# Patient Record
Sex: MALE | Race: WHITE | NOT HISPANIC OR LATINO | Employment: PART TIME | URBAN - METROPOLITAN AREA
[De-identification: names, ages, dates, MRNs, and addresses within clinical notes are randomized per-mention and may not be internally consistent; named-entity substitution may affect disease eponyms.]

---

## 2018-05-26 ENCOUNTER — HOSPITAL ENCOUNTER (EMERGENCY)
Facility: HOSPITAL | Age: 23
Discharge: HOME | End: 2018-05-26
Attending: EMERGENCY MEDICINE
Payer: MEDICAID

## 2018-05-26 VITALS
RESPIRATION RATE: 18 BRPM | HEIGHT: 68 IN | BODY MASS INDEX: 34.86 KG/M2 | OXYGEN SATURATION: 99 % | SYSTOLIC BLOOD PRESSURE: 128 MMHG | TEMPERATURE: 99 F | HEART RATE: 94 BPM | WEIGHT: 230 LBS | DIASTOLIC BLOOD PRESSURE: 72 MMHG

## 2018-05-26 DIAGNOSIS — R31.9 HEMATURIA, UNSPECIFIED TYPE: ICD-10-CM

## 2018-05-26 DIAGNOSIS — J02.9 PHARYNGITIS, UNSPECIFIED ETIOLOGY: ICD-10-CM

## 2018-05-26 DIAGNOSIS — R09.81 NASAL CONGESTION: ICD-10-CM

## 2018-05-26 DIAGNOSIS — R74.8 ELEVATED CPK: ICD-10-CM

## 2018-05-26 DIAGNOSIS — L27.0 DRUG ERUPTION: Primary | ICD-10-CM

## 2018-05-26 LAB
ALBUMIN SERPL BCP-MCNC: 4.1 G/DL
ALP SERPL-CCNC: 102 U/L
ALT SERPL W/O P-5'-P-CCNC: 51 U/L
ANION GAP SERPL CALC-SCNC: 14 MMOL/L
APTT BLDCRRT: 26.9 SEC
AST SERPL-CCNC: 45 U/L
BACTERIA #/AREA URNS HPF: ABNORMAL /HPF
BASOPHILS # BLD AUTO: 0.01 K/UL
BASOPHILS NFR BLD: 0.1 %
BILIRUB SERPL-MCNC: 1.1 MG/DL
BILIRUB UR QL STRIP: NEGATIVE
BUN SERPL-MCNC: 11 MG/DL
CALCIUM SERPL-MCNC: 9.8 MG/DL
CHLORIDE SERPL-SCNC: 103 MMOL/L
CK SERPL-CCNC: 410 U/L
CLARITY UR: CLEAR
CO2 SERPL-SCNC: 23 MMOL/L
COLOR UR: ABNORMAL
CREAT SERPL-MCNC: 1.3 MG/DL
DEPRECATED S PYO AG THROAT QL EIA: NEGATIVE
DIFFERENTIAL METHOD: ABNORMAL
EOSINOPHIL # BLD AUTO: 0 K/UL
EOSINOPHIL NFR BLD: 0.5 %
ERYTHROCYTE [DISTWIDTH] IN BLOOD BY AUTOMATED COUNT: 13.3 %
EST. GFR  (AFRICAN AMERICAN): >60 ML/MIN/1.73 M^2
EST. GFR  (NON AFRICAN AMERICAN): >60 ML/MIN/1.73 M^2
GLUCOSE SERPL-MCNC: 132 MG/DL
GLUCOSE UR QL STRIP: NEGATIVE
HCT VFR BLD AUTO: 41 %
HGB BLD-MCNC: 14 G/DL
HGB UR QL STRIP: ABNORMAL
HYALINE CASTS #/AREA URNS LPF: 0 /LPF
INR PPP: 1.1
KETONES UR QL STRIP: ABNORMAL
LEUKOCYTE ESTERASE UR QL STRIP: NEGATIVE
LYMPHOCYTES # BLD AUTO: 1.1 K/UL
LYMPHOCYTES NFR BLD: 14 %
MCH RBC QN AUTO: 27.4 PG
MCHC RBC AUTO-ENTMCNC: 34.1 G/DL
MCV RBC AUTO: 80 FL
MICROSCOPIC COMMENT: ABNORMAL
MONOCYTES # BLD AUTO: 0.7 K/UL
MONOCYTES NFR BLD: 8.1 %
NEUTROPHILS # BLD AUTO: 6.3 K/UL
NEUTROPHILS NFR BLD: 77.3 %
NITRITE UR QL STRIP: NEGATIVE
NON-SQ EPI CELLS #/AREA URNS HPF: 2 /HPF
PH UR STRIP: 5 [PH] (ref 5–8)
PLATELET # BLD AUTO: 110 K/UL
PMV BLD AUTO: 12.5 FL
POTASSIUM SERPL-SCNC: 3.5 MMOL/L
PROT SERPL-MCNC: 8.8 G/DL
PROT UR QL STRIP: ABNORMAL
PROTHROMBIN TIME: 11.8 SEC
RBC # BLD AUTO: 5.11 M/UL
RBC #/AREA URNS HPF: 3 /HPF (ref 0–4)
SODIUM SERPL-SCNC: 140 MMOL/L
SP GR UR STRIP: >1.03 (ref 1–1.03)
URN SPEC COLLECT METH UR: ABNORMAL
UROBILINOGEN UR STRIP-ACNC: ABNORMAL EU/DL
WBC # BLD AUTO: 8.12 K/UL
WBC #/AREA URNS HPF: 1 /HPF (ref 0–5)
YEAST URNS QL MICRO: ABNORMAL

## 2018-05-26 PROCEDURE — 87880 STREP A ASSAY W/OPTIC: CPT

## 2018-05-26 PROCEDURE — 85025 COMPLETE CBC W/AUTO DIFF WBC: CPT

## 2018-05-26 PROCEDURE — 85610 PROTHROMBIN TIME: CPT

## 2018-05-26 PROCEDURE — 80053 COMPREHEN METABOLIC PANEL: CPT

## 2018-05-26 PROCEDURE — 81000 URINALYSIS NONAUTO W/SCOPE: CPT

## 2018-05-26 PROCEDURE — 99283 EMERGENCY DEPT VISIT LOW MDM: CPT

## 2018-05-26 PROCEDURE — 82550 ASSAY OF CK (CPK): CPT

## 2018-05-26 PROCEDURE — 87081 CULTURE SCREEN ONLY: CPT

## 2018-05-26 PROCEDURE — 85730 THROMBOPLASTIN TIME PARTIAL: CPT

## 2018-05-26 RX ORDER — FLUTICASONE PROPIONATE 50 MCG
1 SPRAY, SUSPENSION (ML) NASAL 2 TIMES DAILY PRN
Qty: 15 G | Refills: 0 | Status: SHIPPED | OUTPATIENT
Start: 2018-05-26

## 2018-05-26 RX ORDER — AMOXICILLIN AND CLAVULANATE POTASSIUM 875; 125 MG/1; MG/1
1 TABLET, FILM COATED ORAL
COMMUNITY
End: 2018-05-26 | Stop reason: SINTOL

## 2018-05-26 RX ORDER — IBUPROFEN 400 MG/1
400 TABLET ORAL EVERY 4 HOURS
COMMUNITY
End: 2019-05-01 | Stop reason: SDUPTHER

## 2018-05-27 NOTE — ED PROVIDER NOTES
Encounter Date: 5/26/2018    SORT:  23 y.o. male presenting to ED for rash to b/l lower extremities. Started Augmentin for the first time 2 days ago for sore throat. Denies Hx of similar symptoms. If orders were placed, they are pending.     Amrit Gloria PA-C  05/26/2018  8:52 PM     NEW NOTE STARTED BY ACCEPTING PROVIDER. PLEASE REFER TO ACCEPTING PROVIDER'S NOTE.      History   No chief complaint on file.    This is a 23-year-old male presents with complaint of rash developing on both legs yesterday.  Patient reports a burning sensation where the rash is on bilateral legs.  He started taking Augmentin 2 days ago for a sore throat and nasal congestion.  Explains a sore throat and slightly improved.  He denies fever, arthralgias, diarrhea, abdominal pain, shortness of breath.          Review of patient's allergies indicates:  Allergies not on file  No past medical history on file.  No past surgical history on file.  No family history on file.  Social History   Substance Use Topics    Smoking status: Not on file    Smokeless tobacco: Not on file    Alcohol use Not on file     Review of Systems    Physical Exam     Initial Vitals   BP Pulse Resp Temp SpO2   -- -- -- -- --      MAP       --         Physical Exam    ED Course   Procedures  Labs Reviewed - No data to display                            ED Course as of May 26 2303   Sat May 26, 2018   2215 Occult Blood UA: (!) 2+ [RB]   2303 CPK: (!) 410 [AW]   2303 CPK: (!) 410 [AW]      ED Course User Index  [AW] Bar Roman PA-C  [RB] Erin Beltran MD     Clinical Impression:   There were no encounter diagnoses.

## 2018-05-27 NOTE — ED TRIAGE NOTES
Pt reports rash to bilateral lower extremities.  Reports rash started about 0500 this morning.  States he started taking Augmentin and ibuprofen two days ago.  Pt reports Augmentin was last taken this morning at 0400.

## 2018-05-27 NOTE — ED PROVIDER NOTES
Encounter Date: 5/26/2018       History     Chief Complaint   Patient presents with    Rash     pt c/o rash to bilateral LE startng at 4:00 am after taking augmentin and ibuprofen 2 days ago     This is a 23-year-old male with no medical history presents with rash to bilateral lower extremities that started yesterday.  Patient reports the rash is slightly burning.  It does not itch, and is not particularly painful.  Patient was prescribed Augmentin and ibuprofen which he started taking 2 days ago for sore throat and sinus congestion. He has never taken Augmentin before.  He denies other rash, arthralgias, fever, abdominal pain, diarrhea, or joint swelling.  He reports he does still have a mild sore throat and nasal congestion.  No family history of blood or known rheumatologic disorders.  No recent significant outdoor exposure.           Review of patient's allergies indicates:  No Known Allergies  History reviewed. No pertinent past medical history.  History reviewed. No pertinent surgical history.  History reviewed. No pertinent family history.  Social History   Substance Use Topics    Smoking status: Current Some Day Smoker    Smokeless tobacco: Not on file      Comment: Pt reports he smokes hookah sometimes.     Alcohol use No     Review of Systems   Constitutional: Negative for fatigue and fever.   HENT: Positive for congestion and sore throat.    Respiratory: Negative for shortness of breath.    Cardiovascular: Negative for chest pain.   Gastrointestinal: Negative for abdominal pain, diarrhea, nausea and vomiting.   Genitourinary: Negative for dysuria.   Musculoskeletal: Negative for arthralgias, back pain, joint swelling and myalgias.   Skin: Positive for rash.   Neurological: Negative for weakness.   Hematological: Does not bruise/bleed easily.       Physical Exam     Initial Vitals [05/26/18 2053]   BP Pulse Resp Temp SpO2   139/82 89 20 99.7 °F (37.6 °C) 100 %      MAP       101         Physical  Exam    Vitals reviewed.  Constitutional: He appears well-developed and well-nourished. He is not diaphoretic. No distress.   HENT:   Head: Normocephalic and atraumatic.   Right Ear: External ear normal.   Left Ear: External ear normal.   Nose: Mucosal edema present. No sinus tenderness. Right sinus exhibits no maxillary sinus tenderness and no frontal sinus tenderness. Left sinus exhibits no maxillary sinus tenderness and no frontal sinus tenderness.   Symmetrically enlarged tonsils without exudate. No uvula edema or deviation.   Eyes: Conjunctivae are normal. No scleral icterus.   Neck: Normal range of motion. Neck supple.   Cardiovascular: Normal rate, regular rhythm, normal heart sounds and intact distal pulses.   Pulmonary/Chest: Breath sounds normal. No respiratory distress. He has no wheezes. He has no rhonchi. He has no rales. He exhibits no tenderness.   Abdominal: Soft. He exhibits no distension and no mass. There is no tenderness. There is no rebound and no guarding.   Musculoskeletal: Normal range of motion.   Lymphadenopathy:     He has no cervical adenopathy.   Neurological: He is alert and oriented to person, place, and time.   Skin: Skin is warm and dry. Rash noted. No erythema.   Raised, macular papular, erythematous, nonblanching rash to bilateral lower extremities below the knees, greatest in concentration near the ankles, but also found on the dorsum of both feet.  No lesions to the palms or soles, or other parts of his body.         ED Course   Procedures  Labs Reviewed   CBC W/ AUTO DIFFERENTIAL - Abnormal; Notable for the following:        Result Value    MCV 80 (*)     Platelets 110 (*)     Gran% 77.3 (*)     Lymph% 14.0 (*)     All other components within normal limits   COMPREHENSIVE METABOLIC PANEL - Abnormal; Notable for the following:     Glucose 132 (*)     Total Protein 8.8 (*)     Total Bilirubin 1.1 (*)     AST 45 (*)     ALT 51 (*)     All other components within normal limits    URINALYSIS - Abnormal; Notable for the following:     Specific Gravity, UA >1.030 (*)     Protein, UA 1+ (*)     Ketones, UA 1+ (*)     Occult Blood UA 2+ (*)     Urobilinogen, UA 4.0-6.0 (*)     All other components within normal limits   URINALYSIS MICROSCOPIC - Abnormal; Notable for the following:     Yeast, UA Few (*)     Non-Squam Epith 2 (*)     All other components within normal limits   CK - Abnormal; Notable for the following:      (*)     All other components within normal limits   THROAT SCREEN, RAPID   CULTURE, STREP A,  THROAT   PROTIME-INR   APTT   CK             Medical Decision Making:   Initial Assessment:   23-year-old male with no medical history has a rash to bilateral legs starting the day after starting Augmentin for sore throat and sinus congestion.  He has a purpuric rash to bilateral legs.  No lesions to the palms, soles or other body part.  No respiratory distress or urticaria.  Lungs are clear with normal work of breathing.  I doubt anaphylaxis.  No mucocutaneous lesions. Doubt SJS/TEN.   ED Management:  Labs obtained show normal coags and platelets 110.   UA with 2+ occult blood. .  Doubt significant rhabdo or myoglobinuria.  Patient likely with drug eruption, side effects of Augmentin.  Augmentin also with hematuria in side effect profile. Will discontinue Augmentin.  Rapid strep negative. Will continue symptomatic management for pharyngitis and add Flonase for nasal congestion.  Low suspicion for vasculitis, septicemia, or other serious pathology.  Patient instructed to follow up with PCP for re-evaluation in 2 days.  He was also discharged with return precautions.  He verbalized understanding and agreed with plan.                   ED Course as of May 27 0022   Sat May 26, 2018   2215 Occult Blood UA: (!) 2+ [RB]   2303 CPK: (!) 410 [AW]   2303 CPK: (!) 410 [AW]      ED Course User Index  [AW] Bar Roman PA-C  [RB] Erin Beltran MD     Clinical Impression:   The  primary encounter diagnosis was Drug eruption. Diagnoses of Pharyngitis, unspecified etiology, Nasal congestion, Elevated CPK, and Hematuria, unspecified type were also pertinent to this visit.                           Bar Roman PA-C  05/27/18 0022

## 2018-05-28 LAB — BACTERIA THROAT CULT: NORMAL

## 2019-05-01 ENCOUNTER — HOSPITAL ENCOUNTER (EMERGENCY)
Facility: HOSPITAL | Age: 24
Discharge: HOME | End: 2019-05-01
Attending: EMERGENCY MEDICINE
Payer: MEDICAID

## 2019-05-01 VITALS
OXYGEN SATURATION: 98 % | SYSTOLIC BLOOD PRESSURE: 145 MMHG | WEIGHT: 230 LBS | HEART RATE: 77 BPM | HEIGHT: 68 IN | BODY MASS INDEX: 34.86 KG/M2 | RESPIRATION RATE: 22 BRPM | TEMPERATURE: 98 F | DIASTOLIC BLOOD PRESSURE: 83 MMHG

## 2019-05-01 DIAGNOSIS — N43.3 HYDROCELE IN ADULT: Primary | ICD-10-CM

## 2019-05-01 DIAGNOSIS — N50.819 TESTICLE PAIN: ICD-10-CM

## 2019-05-01 LAB
BILIRUB UR QL STRIP: NEGATIVE
CLARITY UR: CLEAR
COLOR UR: YELLOW
GLUCOSE UR QL STRIP: NEGATIVE
HGB UR QL STRIP: NEGATIVE
KETONES UR QL STRIP: NEGATIVE
LEUKOCYTE ESTERASE UR QL STRIP: NEGATIVE
NITRITE UR QL STRIP: NEGATIVE
PH UR STRIP: 5 [PH] (ref 5–8)
PROT UR QL STRIP: NEGATIVE
SP GR UR STRIP: 1.03 (ref 1–1.03)
URN SPEC COLLECT METH UR: NORMAL
UROBILINOGEN UR STRIP-ACNC: NEGATIVE EU/DL

## 2019-05-01 PROCEDURE — 87491 CHLMYD TRACH DNA AMP PROBE: CPT

## 2019-05-01 PROCEDURE — 99284 EMERGENCY DEPT VISIT MOD MDM: CPT

## 2019-05-01 PROCEDURE — 81003 URINALYSIS AUTO W/O SCOPE: CPT

## 2019-05-01 RX ORDER — IBUPROFEN 600 MG/1
600 TABLET ORAL EVERY 6 HOURS PRN
Qty: 30 TABLET | Refills: 0 | Status: SHIPPED | OUTPATIENT
Start: 2019-05-01

## 2019-05-02 NOTE — ED TRIAGE NOTES
Pt arrived to the ED due to testicle pain that started around 3 pm today. Pt reports that this is the pt's 2 occurrence of testicular pain. Pt reports his 1st occurrence was a couple weeks ago and lasted 7 hours.

## 2019-05-02 NOTE — DISCHARGE INSTRUCTIONS
On exam you do not have any hernias.  Ultrasound demonstrates normal blood flow to the testicles but does show a hydrocele.  Were tighter fitting and more supportive underwear.  Use ibuprofen as needed for pain. Make an appointment to see a urologist to monitor your symptoms and address if any further treatment is needed for your hydrocele.  Return to the emergency department for severe testicle pain that is not improved with medications, fever, pain or burning with urination or any new, worsening or concerning symptoms.

## 2019-05-02 NOTE — ED PROVIDER NOTES
Encounter Date: 5/1/2019    SCRIBE #1 NOTE: I, Benny Anthony, am scribing for, and in the presence of,  Katelynn Estrada MD. I have scribed the following portions of the note - Other sections scribed: HPI, ROS, PE.       History     Chief Complaint   Patient presents with    Testicle Pain     reports having pain to testicle two weeks ago, and now reports return of pain to right testicle today.     CC: Testicle Pain    HPI: This is a 24 y.o. M who has no PMHx who presents to the ED for emergent evaluation of acute right testicular pain with associated lower abdominal discomfort that began today. Symptoms are exacerbated by ambulating and alleviated by lying down and resting. Pt reports a previous episode of left testicular pain 2 weeks ago that spontaneously resolved. Pt states that he worked out at the gym for the first time 2 weeks ago. Pt has no STD concerns or high risk sexual activity. Pt denies fever, chills, SOB, CP, nausea, vomiting, diarrhea, penile discharge, dysuria, or urinary frequency.    The history is provided by the patient. No  was used.     Review of patient's allergies indicates:   Allergen Reactions    Amoxicillin      No past medical history on file.  No past surgical history on file.  No family history on file.  Social History     Tobacco Use    Smoking status: Current Some Day Smoker    Tobacco comment: Pt reports he smokes hookah sometimes.    Substance Use Topics    Alcohol use: No    Drug use: No     Review of Systems   Constitutional: Negative for chills and fever.   HENT: Negative for ear pain and sore throat.    Eyes: Negative for pain.   Respiratory: Negative for cough and shortness of breath.    Cardiovascular: Negative for chest pain.   Gastrointestinal: Negative for abdominal pain, diarrhea, nausea and vomiting.        (+) Lower abdominal discomfort   Genitourinary: Positive for testicular pain. Negative for discharge, dysuria and frequency.    Musculoskeletal: Negative for back pain.   Skin: Negative for rash.   Neurological: Negative for headaches.       Physical Exam     Initial Vitals [05/01/19 1851]   BP Pulse Resp Temp SpO2   (!) 146/74 77 18 98.6 °F (37 °C) 98 %      MAP       --         Physical Exam    Nursing note and vitals reviewed.  Constitutional: He is not diaphoretic. No distress.   HENT:   Head: Normocephalic and atraumatic.   Mouth/Throat: Oropharynx is clear and moist.   Eyes: Conjunctivae and EOM are normal. Pupils are equal, round, and reactive to light. No scleral icterus.   Neck: Normal range of motion. Neck supple. No JVD present.   Cardiovascular: Normal rate, regular rhythm and intact distal pulses.   Pulmonary/Chest: Breath sounds normal. No stridor. No respiratory distress.   Abdominal: Soft. Bowel sounds are normal. He exhibits no distension. There is no tenderness. No hernia. Hernia confirmed negative in the right inguinal area and confirmed negative in the left inguinal area.   Genitourinary: Penis normal. Cremasteric reflex is present. Right testis shows no mass. Left testis shows no mass. Circumcised. No penile erythema. No discharge found.   Genitourinary Comments: Bilateral tenderness of the spermatic cord.  Mild enlargement of the right testicle compared to left.   Musculoskeletal: Normal range of motion. He exhibits no edema or tenderness.   Lymphadenopathy: No inguinal adenopathy noted on the right or left side.   Neurological: He is alert and oriented to person, place, and time. He has normal strength. No cranial nerve deficit.   Skin: Skin is warm and dry. No rash noted.   Psychiatric: He has a normal mood and affect.         ED Course   Procedures  Labs Reviewed   C. TRACHOMATIS/N. GONORRHOEAE BY AMP DNA    Narrative:     Resulting Location->[facility]   URINALYSIS, REFLEX TO URINE CULTURE    Narrative:     Preferred Collection Type->Urine, Clean Catch          Imaging Results          US Scrotum And Testicles (Final  result)  Result time 05/01/19 20:37:12    Final result by Tania Abebe MD (05/01/19 20:37:12)                 Impression:      No intra testicular masses.  Minimal asymmetric prominence of the right testicle relative to the left.    Small focal fluid noted lateral to the right testicle.    Left epididymal head cyst.  Neither epididymi are measured by technologist.      Electronically signed by: Tania Abebe  Date:    05/01/2019  Time:    20:37             Narrative:    EXAMINATION:  TESTICULAR ULTRASOUND WITH DOPPLER ANALYSIS    CLINICAL HISTORY:  Testicular pain, unspecified    TECHNIQUE:  Real-time testicular ultrasound was performed. Color and pulse Doppler imaging was utilized.    COMPARISON:  None.    FINDINGS:  The right testicle is homogeneous in echotexture and normal in size measuring 3.9 x 2.5 x 3.0 cm. There are no intratesticular masses. There is normal color flow seen to the right testicle.    The left testicle is homogeneous in echotexture and normal in size measuring 3.8 x 2.5 x 2.8 cm. There is normal color flow seen to the left testicle.    There is a 4 x 3 x 4 mm left epididymal head cyst.    Color Doppler imaging demonstrates  blood flow in both testes. Pulse imaging demonstrates arterial and venous  waveforms.    There is no varicocele in either scrotum.  There is small fluid noted next to the right testicle.  There is no left hydrocele.                                 Medical Decision Making:   History:   Old Medical Records: I decided to obtain old medical records.  Old Records Summarized: other records.       <> Summary of Records: Past ED visit for drug eruption  Differential Diagnosis:   Varicocele  Hydrocele   Orchitis  Low clinical suspicion of hernia given lack of hernia detected on exam  Low clinical suspicion of STI given lack of high-risk sexual activity or discharge  Low clinical suspicion of testicular torsion given intact cremasteric reflexes  Clinical Tests:   Lab Tests:  Ordered and Reviewed  Radiological Study: Ordered and Reviewed  ED Management:  Patient is afebrile and in no acute distress. He has cremasteric reflexes intact bilaterally.  There is no penile discharge. UA not indicative of UTI.  Scrotal ultrasound does not demonstrate testicular masses.  There is a likely small right hydrocele at a left epididymal head cyst.  Patient does not have significant tenderness on exam.  He is hemodynamically stable and fit for discharge to follow up with primary physician and urologist.  Patient counseled on wearing supportive undergarments, and concerning symptoms for which to return to the emergency department.  Given prescription for ibuprofen as needed for pain. Referred to primary physician and Urology.  This chart was completed using dictation software, as a result there may be some transcription errors.             Scribe Attestation:   Scribe #1: I performed the above scribed service and the documentation accurately describes the services I performed. I attest to the accuracy of the note.    Attending Attestation:           Physician Attestation for Scribe:  Physician Attestation Statement for Scribe #1: I, Katelynn Estrada MD, reviewed documentation, as scribed by Benny Anthony in my presence, and it is both accurate and complete.                    Clinical Impression:       ICD-10-CM ICD-9-CM   1. Hydrocele in adult N43.3 603.9   2. Testicle pain N50.819 608.9         Disposition:   Disposition: Discharged  Condition: Stable                        Katelynn Estrada MD  05/11/19 0819

## 2019-05-03 LAB
C TRACH DNA SPEC QL NAA+PROBE: NOT DETECTED
N GONORRHOEA DNA SPEC QL NAA+PROBE: NOT DETECTED

## 2019-10-09 DIAGNOSIS — Z00.00 ENCOUNTER FOR GENERAL ADULT MEDICAL EXAMINATION WITHOUT ABNORMAL FINDINGS: ICD-10-CM

## 2019-10-09 DIAGNOSIS — K75.2 NONSPECIFIC REACTIVE HEPATITIS: ICD-10-CM

## 2019-10-11 ENCOUNTER — HOSPITAL ENCOUNTER (OUTPATIENT)
Dept: RADIOLOGY | Facility: HOSPITAL | Age: 24
Discharge: HOME | End: 2019-10-11
Attending: HOSPITALIST
Payer: MEDICAID

## 2019-10-11 DIAGNOSIS — Z00.00 ENCOUNTER FOR GENERAL ADULT MEDICAL EXAMINATION WITHOUT ABNORMAL FINDINGS: ICD-10-CM

## 2019-10-11 DIAGNOSIS — K75.2 NONSPECIFIC REACTIVE HEPATITIS: ICD-10-CM

## 2019-10-11 PROCEDURE — 76700 US ABDOMEN COMPLETE: ICD-10-PCS | Mod: 26,,, | Performed by: RADIOLOGY

## 2019-10-11 PROCEDURE — 76700 US EXAM ABDOM COMPLETE: CPT | Mod: TC

## 2019-10-11 PROCEDURE — 76700 US EXAM ABDOM COMPLETE: CPT | Mod: 26,,, | Performed by: RADIOLOGY
